# Patient Record
(demographics unavailable — no encounter records)

---

## 2018-06-28 NOTE — OR
DATE OF OPERATION:  06/28/2018

 

SURGEON:  Scottie Garcia MD

 

PROCEDURES PERFORMED:  Upper endoscopy and colonoscopy with cold loop snare

biopsy.

 

PREOPERATIVE DIAGNOSES:  History of dysphagia and regurgitation,

gastroesophageal reflux disease, and need for colon cancer screening.

 

INDICATIONS FOR PROCEDURE:  This is a 67-year-old white male who presents with

the above-mentioned complaints.  He was offered and accepted both an upper and

lower endoscopies.

 

DESCRIPTION OF PROCEDURE:  After an excellent IV sedation was administered, the

bite block was inserted.  The flexible endoscope was passed without difficulty

down the patient's esophagus and into the stomach.  The stomach was insufflated.

The scope was passed through the pylorus, to the second portion of the duodenum,

and slowly withdrawn.  The following findings were noted.  The duodenum was

unremarkable.  The stomach was unremarkable.  The esophagus was unremarkable.

GE junction measured approximately 40 cm.  No evidence of reflux disease.  No

evidence of diverticulum or stricture.  The stomach was deflated.  The scope was

removed.  Our attention was then turned to the colon.  Digital rectal exam was

performed.  No marked abnormality was noted.  Flexible colonoscope was inserted

and advanced to the cecum without difficulty.  The prep was excellent.  The

following findings were noted.  Ascending colon, unremarkable.  Transverse

colon, unremarkable.  Descending colon, unremarkable.  Sigmoid, unremarkable.

Rectum, just above the anal verge, 2 polypoid lesions, biopsied with a cold loop

snare,

retrieved and submitted in one container.  The colon was deflated.  The scope

was removed.  The patient tolerated the procedure well and was taken to Recovery

in a good condition.

 

Job#: [id number]

DD: 06/28/2018 0931

DT: 06/28/2018 0952 AS/MODL

## 2018-06-28 NOTE — PCM.OPNOTE
- General Post-Op/Procedure Note


Date of Surgery/Procedure: 06/28/18


Operative Procedure(s): egd.  c scope with bx


Findings: 





normal upper endoscopy 


rectal polyps x2





Pre Op Diagnosis: dysphagia.  colon cancer screening


Post-Op Diagnosis: normal upper endoscopy.  rectal polyps x2


Anesthesia Technique: MAC


Primary Surgeon: Scottie Garcia


Anesthesia Provider: Dakotah Araiza


Pathology: 





rectal polyps 


Complications: None


Condition: Good


Free Text/Narrative:: 





see dictation